# Patient Record
Sex: FEMALE | Race: BLACK OR AFRICAN AMERICAN | NOT HISPANIC OR LATINO | URBAN - METROPOLITAN AREA
[De-identification: names, ages, dates, MRNs, and addresses within clinical notes are randomized per-mention and may not be internally consistent; named-entity substitution may affect disease eponyms.]

---

## 2022-12-13 ENCOUNTER — EMERGENCY (EMERGENCY)
Facility: HOSPITAL | Age: 32
LOS: 0 days | Discharge: HOME | End: 2022-12-13
Attending: EMERGENCY MEDICINE | Admitting: EMERGENCY MEDICINE

## 2022-12-13 VITALS — OXYGEN SATURATION: 99 % | TEMPERATURE: 101 F | RESPIRATION RATE: 20 BRPM | HEART RATE: 102 BPM

## 2022-12-13 VITALS
RESPIRATION RATE: 18 BRPM | OXYGEN SATURATION: 100 % | DIASTOLIC BLOOD PRESSURE: 92 MMHG | TEMPERATURE: 102 F | SYSTOLIC BLOOD PRESSURE: 140 MMHG | HEART RATE: 120 BPM

## 2022-12-13 DIAGNOSIS — J02.9 ACUTE PHARYNGITIS, UNSPECIFIED: ICD-10-CM

## 2022-12-13 DIAGNOSIS — B34.9 VIRAL INFECTION, UNSPECIFIED: ICD-10-CM

## 2022-12-13 DIAGNOSIS — R06.02 SHORTNESS OF BREATH: ICD-10-CM

## 2022-12-13 DIAGNOSIS — R07.89 OTHER CHEST PAIN: ICD-10-CM

## 2022-12-13 DIAGNOSIS — M79.10 MYALGIA, UNSPECIFIED SITE: ICD-10-CM

## 2022-12-13 DIAGNOSIS — Z20.822 CONTACT WITH AND (SUSPECTED) EXPOSURE TO COVID-19: ICD-10-CM

## 2022-12-13 DIAGNOSIS — R05.1 ACUTE COUGH: ICD-10-CM

## 2022-12-13 LAB
FLUAV AG NPH QL: DETECTED
FLUBV AG NPH QL: SIGNIFICANT CHANGE UP
RSV RNA NPH QL NAA+NON-PROBE: SIGNIFICANT CHANGE UP
SARS-COV-2 RNA SPEC QL NAA+PROBE: SIGNIFICANT CHANGE UP

## 2022-12-13 PROCEDURE — 99284 EMERGENCY DEPT VISIT MOD MDM: CPT

## 2022-12-13 PROCEDURE — 93010 ELECTROCARDIOGRAM REPORT: CPT

## 2022-12-13 PROCEDURE — 71045 X-RAY EXAM CHEST 1 VIEW: CPT | Mod: 26

## 2022-12-13 RX ORDER — IBUPROFEN 200 MG
600 TABLET ORAL ONCE
Refills: 0 | Status: COMPLETED | OUTPATIENT
Start: 2022-12-13 | End: 2022-12-13

## 2022-12-13 RX ORDER — ACETAMINOPHEN 500 MG
975 TABLET ORAL ONCE
Refills: 0 | Status: COMPLETED | OUTPATIENT
Start: 2022-12-13 | End: 2022-12-13

## 2022-12-13 RX ADMIN — Medication 600 MILLIGRAM(S): at 12:21

## 2022-12-13 RX ADMIN — Medication 600 MILLIGRAM(S): at 12:50

## 2022-12-13 RX ADMIN — Medication 975 MILLIGRAM(S): at 16:35

## 2022-12-13 NOTE — ED PROVIDER NOTE - NSFOLLOWUPCLINICS_GEN_ALL_ED_FT
[facility]  Medicine  [address], [place]   Phone: [phone]  Fax:   Follow Up Time: 7-10 Days

## 2022-12-13 NOTE — ED PROVIDER NOTE - NS ED ROS FT
----- Message from Betsy Wong PA-C sent at 2/16/2020  2:56 PM EST -----  Regarding: HFU  Reason for follow up: hemorrhage/stroke  -subspecialty for follow up: vascular neurology  -timeline of 4-6 weeks  -no established outpatient neurology provider  -prefer attending see, AP if no attending is available to see in timely fashion  -no outstanding labs/imaging/testing prior to follow up  Thank you! Review of Systems:  	•	CONSTITUTIONAL - + fever, no diaphoresis, no chills  	•	SKIN - no rash  	•	HEMATOLOGIC - no bleeding, no bruising  	•	EYES - no eye pain, no blurry vision  	•	ENT - no congestion  	•	RESPIRATORY - no shortness of breath, + cough  	•	CARDIAC - no chest pain, no palpitations  	•	GI - no abd pain, no nausea, no vomiting, no diarrhea, no constipation  	•	GENITO-URINARY - no dysuria; no hematuria, no increased urinary frequency  	•	MUSCULOSKELETAL - +body aches, no swelling, no redness  	•	NEUROLOGIC - no weakness, no headache, no paresthesias, no LOC  	•	PSYCH - no anxiety, no depression  	All other ROS are negative except as documented in HPI.

## 2022-12-13 NOTE — ED PROVIDER NOTE - NSFOLLOWUPINSTRUCTIONS_ED_ALL_ED_FT
Viral Respiratory Infection    A viral respiratory infection is an illness that affects parts of the body used for breathing, like the lungs, nose, and throat. It is caused by a germ called a virus. Symptoms can include runny nose, coughing, sneezing, fatigue, body aches, sore throat, fever, or headache. Over the counter medicine can be used to manage the symptoms but the infection itself goes away on its own.     SEEK IMMEDIATE MEDICAL CARE IF YOU HAVE THE FOLLOWING SYMPTOMS: shortness of breath, chest pain, fever over 10 days, lightheadedness/dizziness. Our Emergency Department Referral Coordinators will be reaching out ot you in the next 24-48 hours from 9:00am to 5:00pm (Monday to Friday) with a follow up appointment. Please expect a phone call from the hospital in that time frame. If you do not receive a call or if you have any questions or concerns, you can reach them at [phone] or [phone].     Viral Respiratory Infection    A viral respiratory infection is an illness that affects parts of the body used for breathing, like the lungs, nose, and throat. It is caused by a germ called a virus. Symptoms can include runny nose, coughing, sneezing, fatigue, body aches, sore throat, fever, or headache. Over the counter medicine can be used to manage the symptoms but the infection itself goes away on its own.     SEEK IMMEDIATE MEDICAL CARE IF YOU HAVE THE FOLLOWING SYMPTOMS: shortness of breath, chest pain, fever over 10 days, lightheadedness/dizziness.    Follow up with your primary care doctor within 1 week.     Drink plenty of fluids (water, soup, gatorade, pedialyte) to stay hydrated.     You can take acetaminophen (Tylenol) 650mg every 4-6 hours as needed. This is an over-the-counter medication you may purchase without a prescription.     You can take ibuprofen 600mg every 6-8 hours as needed. This is an over-the-counter medication you may purchase without a prescription.

## 2022-12-13 NOTE — ED PROVIDER NOTE - OBJECTIVE STATEMENT
32-year-old female with with no past medical history presenting for evaluation of body aches, sore throat, cough over the last 3 days.  Symptoms worsen this morning.  Patient tried drinking some tea without any relief.  States her father has been sick.  Reports fever. No cp, sob, fever, chills, abdominal pain, nausea, vomiting, diarrhea, back pain, urinary symptoms, headache, dizziness, paresthesias, or weakness.

## 2022-12-13 NOTE — ED PROVIDER NOTE - PATIENT PORTAL LINK FT
You can access the FollowMyHealth Patient Portal offered by [facility] by registering at the following website: [email or web address] By joining [facility] FollowMyHealth portal, you will also be able to view your health information using other applications (apps) compatible with our system.

## 2022-12-13 NOTE — ED PROVIDER NOTE - WET READ LAUNCH FT
Left message on patients voicemail to call the office back.    There are no Wet Read(s) to document. There is 1 Wet Read(s) to document.

## 2022-12-13 NOTE — ED PROVIDER NOTE - NS ED ATTENDING STATEMENT MOD
This was a shared visit with the KAYLEIGH. I reviewed and verified the documentation and independently performed the documented:

## 2022-12-13 NOTE — ED PROVIDER NOTE - PROGRESS NOTE DETAILS
jfk: upreg neg per RN    JFK: No focal consolidation, signs of opacities or edema, pneumothorax, free air or evidence of trauma on my interpretation.

## 2022-12-13 NOTE — ED PROVIDER NOTE - CLINICAL SUMMARY MEDICAL DECISION MAKING FREE TEXT BOX
32-year-old female with no significant past medical history presents with couple days of progressively worsening fever, chills, myalgias and now worsening fatigue.  Denies any neck stiffness.  Mild chest discomfort.  Minimal shortness of breath.  Positive cough.  On exam fatigued appearing, febrile and tachycardic, lungs clear bilaterally, no wheezes, rales or rhonchi, heart regular no murmurs, abdomen benign, no focal neurological deficits, no meningismus, no rash, normal work of breathing.  Chest x-ray clear with no focal consolidation, EKG with no concerning ischemic changes.  Given symptomatic treatment in the ED with improvement clinically and with vital signs. suspect viral, clincially low concrern for SBI. In my opinion, based on current evaluation and results, an acute medical or surgical emergency does not appear to be occurring at this time and I feel that the pt is stable for further outpatient work up and/or treatment. Return precautions discussed. supportive care.

## 2022-12-13 NOTE — ED PROVIDER NOTE - PHYSICAL EXAMINATION
